# Patient Record
Sex: MALE | Race: WHITE | NOT HISPANIC OR LATINO | URBAN - METROPOLITAN AREA
[De-identification: names, ages, dates, MRNs, and addresses within clinical notes are randomized per-mention and may not be internally consistent; named-entity substitution may affect disease eponyms.]

---

## 2017-03-03 ENCOUNTER — ALLSCRIPTS OFFICE VISIT (OUTPATIENT)
Dept: OTHER | Facility: OTHER | Age: 22
End: 2017-03-03

## 2018-01-13 VITALS
WEIGHT: 152.25 LBS | SYSTOLIC BLOOD PRESSURE: 116 MMHG | BODY MASS INDEX: 23.07 KG/M2 | DIASTOLIC BLOOD PRESSURE: 78 MMHG | HEIGHT: 68 IN

## 2018-01-15 NOTE — PROGRESS NOTES
Assessment    1  Current every day smoker (305 1) (F17 200)   2  Encounter for 's license history and physical (V70 3) (Z02 4)    Plan  Need for prophylactic vaccination and inoculation against influenza    · We recommend that you bring your body mass index down to 26 ; Status:Complete -  Retrospective By Protocol Authorization;   Done: [id number]   · Stop: Fluzone Quadrivalent 0 5 ML Intramuscular Suspension  PMH: Atypical chest pain, Congenital lobar emphysema    · Combivent Respimat  MCG/ACT Inhalation Aerosol Solution  Screening for depression    · *VB-Depression Screening; Status:Complete - Retrospective By Protocol Authorization;    Done: [id number] 09:40AM    Discussion/Summary  Impression: health maintenance visit, healthy adult male  Patient passed physical to obtain 's permit  Patient is able to Self-Care  The treatment plan was reviewed with the patient/guardian  The patient/guardian understands and agrees with the treatment plan   The patient was counseled regarding  Chief Complaint  Patient here today for 's license physical  No complaints  History of Present Illness  HM, Adult Male:   General Health: The patient's health since the last visit is described as good  He denies vision problems  He denies hearing loss  Lifestyle:  He consumes a diverse and healthy diet  He uses tobacco    Screening:      Review of Systems    Constitutional: no fever and no chills  Eyes: no eyesight problems  ENT: no earache and no hearing loss  Cardiovascular: no chest pain and no palpitations  Respiratory: no shortness of breath and no wheezing  Gastrointestinal: no abdominal pain, no nausea, no vomiting, no constipation and no diarrhea  Genitourinary: no dysuria and no incontinence  Musculoskeletal: no arthralgias and no myalgias  Neurological: no headache and no dizziness  ROS reviewed  Active Problems    1   Congenital lobar emphysema (770 2) (P25 0) 2  History of repair of atrial septal defect (V15 1) (Z98 890,Z87 74)   3  Irritability and anger (799 22) (R45 4)   4  Need for prophylactic vaccination and inoculation against influenza (V04 81) (Z23)   5  Screening for depression (V79 0) (Z13 89)    Past Medical History    · History of Atypical chest pain (786 59) (R07 89)    Surgical History    · History of Heart Surgery   · History of Treatment Of Femoral Fracture   · History of Treatment Of Pelvic Fracture    Family History  Father    · Family history of Diverticulitis (562 11) (K57 92)   · Family history of Heart attack (410 90) (I21 3)    Social History    · Denied: Alcohol use   · Current every day smoker (305 1) (F17 200)   · Denied: Drug use (305 90) (F19 90)    Current Meds   1  Combivent Respimat  MCG/ACT Inhalation Aerosol Solution; INHALE 1 PUFFS 4   times daily PRN; Therapy: [id number] to (Last Rx:[id number])  Requested for: 20ZEJ1600 Ordered    Allergies    1  No Known Drug Allergies    Vitals   Recorded: 24CAZ4008 99:15GX   Systolic 338, LUE, Sitting   Diastolic 78, LUE, Sitting   Height 5 ft 8 in   Weight 152 lb 4 0 oz   BMI Calculated 23 15   BSA Calculated 1 82     Physical Exam    Constitutional   General appearance: No acute distress, well appearing and well nourished  Head and Face   Head and face: Normal     Eyes   Pupils and irises: Equal, round, reactive to light  Ears, Nose, Mouth, and Throat   External inspection of ears and nose: Normal     Otoscopic examination: Tympanic membranes translucent with normal light reflex  Canals patent without erythema  Nasal mucosa, septum, and turbinates: Normal without edema or erythema  Lips, teeth, and gums: Normal, good dentition  Neck   Neck: Supple, symmetric, trachea midline, no masses  Pulmonary   Respiratory effort: No increased work of breathing or signs of respiratory distress  Auscultation of lungs: Clear to auscultation      Cardiovascular   Auscultation of heart: Normal rate and rhythm, normal S1 and S2, no murmurs  Carotid pulses: 2+ bilaterally  Abdomen   Abdomen: Non-tender, no masses  Lymphatic   Palpation of lymph nodes in neck: No lymphadenopathy  Musculoskeletal   Gait and station: Normal     Range of motion: Normal     Skin   Skin and subcutaneous tissue: Normal without rashes or lesions  Results/Data  *VB-Depression Screening [id number] 09:40AM Enma Husbands     Test Name Result Flag Reference   Depression Scale Result      Depression Screen - Negative For Symptoms     PHQ-2 Adult Depression Screening [id number] 09:39AM User, Ahs     Test Name Result Flag Reference   PHQ-2 Adult Depression Score 0     Over the last two weeks, how often have you been bothered by any of the following problems?   Little interest or pleasure in doing things: Not at all - 0  Feeling down, depressed, or hopeless: Not at all - 0   PHQ-2 Adult Depression Screening Negative         Signatures   Electronically signed by : Wilfrido Drew, [facility]; Mar  3 2017  9:57AM EST                       (Author)    Electronically signed by : Camilo Purdy DO; Mar  3 2017 10:20AM EST                       (Author)